# Patient Record
Sex: MALE | Race: AMERICAN INDIAN OR ALASKA NATIVE
[De-identification: names, ages, dates, MRNs, and addresses within clinical notes are randomized per-mention and may not be internally consistent; named-entity substitution may affect disease eponyms.]

---

## 2020-05-31 ENCOUNTER — HOSPITAL ENCOUNTER (EMERGENCY)
Dept: HOSPITAL 5 - ED | Age: 85
Discharge: HOME | End: 2020-05-31
Payer: MEDICARE

## 2020-05-31 VITALS — DIASTOLIC BLOOD PRESSURE: 80 MMHG | SYSTOLIC BLOOD PRESSURE: 163 MMHG

## 2020-05-31 DIAGNOSIS — I10: ICD-10-CM

## 2020-05-31 DIAGNOSIS — E11.9: ICD-10-CM

## 2020-05-31 DIAGNOSIS — Z85.46: ICD-10-CM

## 2020-05-31 DIAGNOSIS — T78.3XXA: Primary | ICD-10-CM

## 2020-05-31 DIAGNOSIS — E78.00: ICD-10-CM

## 2020-05-31 DIAGNOSIS — Z87.891: ICD-10-CM
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
## 2020-05-31 DIAGNOSIS — Z88.0: ICD-10-CM

## 2020-05-31 PROCEDURE — 96375 TX/PRO/DX INJ NEW DRUG ADDON: CPT

## 2020-05-31 PROCEDURE — 99284 EMERGENCY DEPT VISIT MOD MDM: CPT

## 2020-05-31 PROCEDURE — 96365 THER/PROPH/DIAG IV INF INIT: CPT

## 2020-05-31 NOTE — EMERGENCY DEPARTMENT REPORT
ED General Adult HPI





- General


Chief complaint: Allergic Reaction


Stated complaint: SWELLING TO TONGUE


Source: patient, EMS


Mode of arrival: Stretcher


Limitations: No Limitations





- History of Present Illness


Initial comments: 





Patient presents to the emergency department with a chief complaint of swelling 

of his tongue that started last night.  Patient states this morning he began to 

have some swelling of his throat thus the reason for him coming to the emergency

department.  Patient is able to swallow without difficulty.  Patient states this

happened to him 10 years ago after taking penicillin mixed with something else 

that he cannot remember.  Patient is not on arm or an ACE inhibitor.  But he 

does take atenolol, clonidine, hydralazine and another medication for hypert

ension.


-: Sudden


Location: mouth


Severity scale (0 -10): 0


Consistency: constant


Improves with: none


Worsens with: none


Associated Symptoms: denies other symptoms


Treatments Prior to Arrival: none





- Related Data


                                    Allergies











Allergy/AdvReac Type Severity Reaction Status Date / Time


 


Penicillins Allergy  Angioedema Verified 05/31/20 08:09














ED Review of Systems


ROS: 


Stated complaint: SWELLING TO TONGUE


Other details as noted in HPI





Constitutional: denies: chills, fever


Eyes: denies: eye pain, eye discharge, vision change


ENT: denies: ear pain, throat pain


Respiratory: denies: cough, shortness of breath, wheezing


Cardiovascular: denies: chest pain, palpitations


Endocrine: no symptoms reported


Gastrointestinal: denies: abdominal pain, nausea, diarrhea


Genitourinary: denies: urgency, dysuria


Musculoskeletal: denies: back pain, joint swelling, arthralgia


Skin: denies: rash, lesions


Neurological: denies: headache, weakness, paresthesias


Psychiatric: denies: anxiety, depression


Hematological/Lymphatic: denies: easy bleeding, easy bruising





ED Past Medical Hx





- Past Medical History


Previous Medical History?: Yes


Hx Hypertension: Yes


Hx Diabetes: Yes


Hx of Cancer: Yes (Prostate CA)


Additional medical history: high cholesterol





- Surgical History


Past Surgical History?: Yes


Additional Surgical History: prostate surgery 2010





- Social History


Smoking Status: Former Smoker


Substance Use Type: None





ED Physical Exam





- General


Limitations: No Limitations


General appearance: alert, in no apparent distress





- Head


Head exam: Present: atraumatic, normocephalic





- Eye


Eye exam: Present: normal appearance, PERRL, EOMI





- ENT


ENT exam: Present: mucous membranes moist, other (Patient has swelling of the 

tongue there is no lesions to the intraoral mucosa.  There is no pooling of 

saliva.  Patient is able to swallow without difficulty.  There is no swelling to

the soft tissues underneath the tongue at the floor of the mouth)





- Neck


Neck exam: Present: normal inspection





- Respiratory


Respiratory exam: Present: normal lung sounds bilaterally.  Absent: respiratory 

distress





- Cardiovascular


Cardiovascular Exam: Present: regular rate, normal rhythm.  Absent: systolic mur

mur, diastolic murmur, rubs, gallop





- GI/Abdominal


GI/Abdominal exam: Present: soft, normal bowel sounds





- Rectal


Rectal exam: Present: deferred





- Extremities Exam


Extremities exam: Present: normal inspection





- Back Exam


Back exam: Present: normal inspection





- Neurological Exam


Neurological exam: Present: alert, oriented X3





- Psychiatric


Psychiatric exam: Present: normal affect, normal mood





- Skin


Skin exam: Present: warm, dry, intact, normal color.  Absent: rash





ED Course


                                   Vital Signs











  05/31/20 05/31/20 05/31/20





  08:02 08:09 08:16


 


Temperature  98.4 F 


 


Pulse Rate  81 82


 


Respiratory  17 15





Rate   


 


Blood Pressure  183/91 183/91


 


O2 Sat by Pulse 94 97 97





Oximetry   














  05/31/20 05/31/20 05/31/20





  08:27 08:30 09:00


 


Temperature   


 


Pulse Rate  88 83


 


Respiratory 15 18 16





Rate   


 


Blood Pressure  169/87 169/87


 


O2 Sat by Pulse 97 93 93





Oximetry   














  05/31/20 05/31/20 05/31/20





  09:30 10:00 10:30


 


Temperature   


 


Pulse Rate 82 81 78


 


Respiratory 25 H 14 16





Rate   


 


Blood Pressure 150/83 132/78 132/78


 


O2 Sat by Pulse 92 91 94





Oximetry   














  05/31/20 05/31/20





  11:00 11:30


 


Temperature  


 


Pulse Rate 77 77


 


Respiratory 12 14





Rate  


 


Blood Pressure 159/82 145/80


 


O2 Sat by Pulse 92 91





Oximetry  














ED Medical Decision Making





- Medical Decision Making


Patient received IV Solu-Medrol, Pepcid, Benadryl


On reevaluation of the patient at 9:30 AM he complained of having some 

difficulty with swallowing and his tongue appeared to be larger


TXA was ordered


Patient was reevaluated at 11 AM and shows significant improvement


Repeat examination at 11:50 AM showed that the patient's angioedema has 

completely resolved


It was discussed with the patient that he should speak to his primary care 

physician about switching hypertension meds specifically clonidine which can 

cause angioedema


Critical care attestation.: 


If time is entered above; I have spent that time in minutes in the direct care 

of this critically ill patient, excluding procedure time.








ED Disposition


Clinical Impression: 


 Angioedema





Disposition: DC-01 TO HOME OR SELFCARE


Is pt being admited?: No


Does the pt Need Aspirin: No


Condition: Stable


Instructions:  Angioedema (ED)


Additional Instructions: 


return if worse


Please follow-up with your primary care physician and have further evaluation 

for substitute blood pressure medication


Please discontinue clonidine which can cause angioedema


Time of Disposition: 11:57

## 2020-09-11 ENCOUNTER — HOSPITAL ENCOUNTER (EMERGENCY)
Dept: HOSPITAL 5 - ED | Age: 85
LOS: 1 days | Discharge: HOME | End: 2020-09-12
Payer: MEDICARE

## 2020-09-11 DIAGNOSIS — E11.9: ICD-10-CM

## 2020-09-11 DIAGNOSIS — Z88.0: ICD-10-CM

## 2020-09-11 DIAGNOSIS — N28.89: Primary | ICD-10-CM

## 2020-09-11 DIAGNOSIS — Z98.890: ICD-10-CM

## 2020-09-11 DIAGNOSIS — E78.00: ICD-10-CM

## 2020-09-11 DIAGNOSIS — I10: ICD-10-CM

## 2020-09-11 LAB
ALBUMIN SERPL-MCNC: 4.3 G/DL (ref 3.9–5)
ALT SERPL-CCNC: 12 UNITS/L (ref 7–56)
APTT BLD: 31.9 SEC. (ref 24.2–36.6)
BASOPHILS # (AUTO): 0 K/MM3 (ref 0–0.1)
BASOPHILS NFR BLD AUTO: 0.7 % (ref 0–1.8)
BUN SERPL-MCNC: 37 MG/DL (ref 9–20)
BUN/CREAT SERPL: 21 %
CALCIUM SERPL-MCNC: 9.7 MG/DL (ref 8.4–10.2)
EOSINOPHIL # BLD AUTO: 0.1 K/MM3 (ref 0–0.4)
EOSINOPHIL NFR BLD AUTO: 1.4 % (ref 0–4.3)
HCT VFR BLD CALC: 42 % (ref 35.5–45.6)
HEMOLYSIS INDEX: 10
HGB BLD-MCNC: 14 GM/DL (ref 11.8–15.2)
INR PPP: 1.02 (ref 0.87–1.13)
LYMPHOCYTES # BLD AUTO: 0.3 K/MM3 (ref 1.2–5.4)
LYMPHOCYTES NFR BLD AUTO: 6.6 % (ref 13.4–35)
MCHC RBC AUTO-ENTMCNC: 33 % (ref 32–34)
MCV RBC AUTO: 97 FL (ref 84–94)
MONOCYTES # (AUTO): 0.7 K/MM3 (ref 0–0.8)
MONOCYTES % (AUTO): 13.7 % (ref 0–7.3)
PLATELET # BLD: 106 K/MM3 (ref 140–440)
RBC # BLD AUTO: 4.32 M/MM3 (ref 3.65–5.03)

## 2020-09-11 PROCEDURE — 85730 THROMBOPLASTIN TIME PARTIAL: CPT

## 2020-09-11 PROCEDURE — 85025 COMPLETE CBC W/AUTO DIFF WBC: CPT

## 2020-09-11 PROCEDURE — 99284 EMERGENCY DEPT VISIT MOD MDM: CPT

## 2020-09-11 PROCEDURE — 74176 CT ABD & PELVIS W/O CONTRAST: CPT

## 2020-09-11 PROCEDURE — 74177 CT ABD & PELVIS W/CONTRAST: CPT

## 2020-09-11 PROCEDURE — 83690 ASSAY OF LIPASE: CPT

## 2020-09-11 PROCEDURE — 96375 TX/PRO/DX INJ NEW DRUG ADDON: CPT

## 2020-09-11 PROCEDURE — 96372 THER/PROPH/DIAG INJ SC/IM: CPT

## 2020-09-11 PROCEDURE — 81001 URINALYSIS AUTO W/SCOPE: CPT

## 2020-09-11 PROCEDURE — 85610 PROTHROMBIN TIME: CPT

## 2020-09-11 PROCEDURE — 36415 COLL VENOUS BLD VENIPUNCTURE: CPT

## 2020-09-11 PROCEDURE — 96374 THER/PROPH/DIAG INJ IV PUSH: CPT

## 2020-09-11 PROCEDURE — 80053 COMPREHEN METABOLIC PANEL: CPT

## 2020-09-11 NOTE — CAT SCAN REPORT
CT abdomen pelvis wo con



INDICATION:  RLQ ab pain.



TECHNIQUE:

All CT scans at this location are performed using the following dose modulation technique: Automated 
exposure control.



CONTRAST:  None.



COMPARISON: None available.



CT ABDOMEN: Evaluation of the parenchymal organs demonstrates a 2.8 cm right renal cyst. A 7.7 cm les
ion arising from the posterior aspect of the right kidney is not a simple cyst. There is a small amou
nt of calcification seen along the lateral aspect..



Negative for abdominal mass, fluid or inflammation. The bowel is not dilated or thickened. Evaluation
 is limited by motion artifact.



CT PELVIS: The appendix is normal. Negative for pelvic mass or fluid collection. Colonic stool is mod
erate.



Radioactive prostate seeds are present.



IMPRESSION:

1. Indeterminate right renal mass.

2. Moderate colonic stool.

3. Evaluation limited by significant motion artifact. 



Signer Name: Sedrick Hawthorne MD 

Signed: 9/11/2020 10:58 PM

Workstation Name: [id number]

## 2020-09-11 NOTE — EMERGENCY DEPARTMENT REPORT
ED General Adult HPI





- General


Chief complaint: Abdominal Pain


Stated complaint: ABDOMINAL AND BACK PAIN


Time Seen by Provider: 20 21:41


Source: patient, EMS


Mode of arrival: Stretcher


Limitations: No Limitations





- History of Present Illness


Initial comments: 





The patient presents to the emergency department the chief complaint of 

abdominal pain located on the lower right aspect of his abdomen that started 

yesterday.  Patient describes the pain is sharp in nature and denies any nausea 

vomiting.  Patient also denies any radiation of the abdominal pain.  Patient 

denies chest pain, shortness breath, or headache.  The patient also complains of

right  flank pain.


-: days(s) (1)


Location: abdomen


Radiation: non-radiation


Severity scale (0 -10): 6


Quality: sharp


Consistency: constant


Improves with: none


Worsens with: none


Associated Symptoms: denies other symptoms


Treatments Prior to Arrival: none





- Related Data


                                  Previous Rx's











 Medication  Instructions  Recorded  Last Taken  Type


 


HYDROcodone/APAP 5-325 [[place] 1 each PO Q6HR PRN #12 tablet 20 Unknown Rx





5/325]    


 


Ondansetron [Zofran Odt] 4 mg PO Q4HR PRN #20 tab.rapdis 20 Unknown Rx











                                    Allergies











Allergy/AdvReac Type Severity Reaction Status Date / Time


 


Penicillins Allergy  Angioedema Verified 20 08:09














ED Review of Systems


ROS: 


Stated complaint: ABDOMINAL AND BACK PAIN


Other details as noted in HPI





Constitutional: denies: chills, fever


Eyes: denies: eye pain, eye discharge, vision change


ENT: denies: ear pain, throat pain


Respiratory: denies: cough, shortness of breath, wheezing


Cardiovascular: denies: chest pain, palpitations


Endocrine: no symptoms reported


Gastrointestinal: abdominal pain.  denies: nausea, diarrhea


Genitourinary: denies: urgency, dysuria


Musculoskeletal: denies: back pain, joint swelling, arthralgia


Skin: denies: rash, lesions


Neurological: denies: headache, weakness, paresthesias


Psychiatric: denies: anxiety, depression


Hematological/Lymphatic: denies: easy bleeding, easy bruising





ED Past Medical Hx





- Past Medical History


Previous Medical History?: Yes


Hx Hypertension: Yes


Hx Diabetes: Yes


Hx Renal Disease: Yes


Additional medical history: high cholesterol





- Surgical History


Past Surgical History?: Yes


Additional Surgical History: prostate surgery 





- Social History


Smoking Status: Never Smoker


Substance Use Type: None





- Medications


Home Medications: 


                                Home Medications











 Medication  Instructions  Recorded  Confirmed  Last Taken  Type


 


HYDROcodone/APAP 5-325 [[place] 1 each PO Q6HR PRN #12 tablet 20  Unknown Rx





5/325]     


 


Ondansetron [Zofran Odt] 4 mg PO Q4HR PRN #20 tab.rapdis 20  Unknown Rx














ED Physical Exam





- General


Limitations: No Limitations


General appearance: alert, in no apparent distress





- Head


Head exam: Present: atraumatic, normocephalic





- Eye


Eye exam: Present: normal appearance, PERRL, EOMI





- ENT


ENT exam: Present: mucous membranes moist





- Neck


Neck exam: Present: normal inspection





- Respiratory


Respiratory exam: Present: normal lung sounds bilaterally.  Absent: respiratory 

distress





- Cardiovascular


Cardiovascular Exam: Present: regular rate, normal rhythm.  Absent: systolic 

murmur, diastolic murmur, rubs, gallop





- GI/Abdominal


GI/Abdominal exam: Present: soft, tenderness (ttp rlq), normal bowel sounds.  

Absent: distended





- Rectal


Rectal exam: Present: deferred





- Extremities Exam


Extremities exam: Present: normal inspection





- Back Exam


Back exam: Present: normal inspection





- Neurological Exam


Neurological exam: Present: alert, oriented X3, CN II-XII intact.  Absent: motor

sensory deficit





- Psychiatric


Psychiatric exam: Present: normal affect, normal mood





- Skin


Skin exam: Present: warm, dry, intact, normal color.  Absent: rash





ED Course


                                   Vital Signs











  20





  21:12 21:45


 


Temperature 97.5 F L 


 


Pulse Rate 72 


 


Respiratory 18 19





Rate  


 


Blood Pressure 208/88 


 


O2 Sat by Pulse 96 97





Oximetry  














ED Medical Decision Making





- Lab Data


Result diagrams: 


                                 20 21:20





                                 20 21:20








                                   Lab Results











  20 Range/Units





  21:20 21:20 22:34 


 


WBC  5.0    (4.5-11.0)  K/mm3


 


RBC  4.32    (3.65-5.03)  M/mm3


 


Hgb  14.0    (11.8-15.2)  gm/dl


 


Hct  42.0    (35.5-45.6)  %


 


MCV  97 H    (84-94)  fl


 


MCH  32    (28-32)  pg


 


MCHC  33    (32-34)  %


 


RDW  13.2    (13.2-15.2)  %


 


Plt Count  106 L    (140-440)  K/mm3


 


Lymph % (Auto)  6.6 L    (13.4-35.0)  %


 


Mono % (Auto)  13.7 H    (0.0-7.3)  %


 


Eos % (Auto)  1.4    (0.0-4.3)  %


 


Baso % (Auto)  0.7    (0.0-1.8)  %


 


Lymph #  0.3 L    (1.2-5.4)  K/mm3


 


Mono #  0.7    (0.0-0.8)  K/mm3


 


Eos #  0.1    (0.0-0.4)  K/mm3


 


Baso #  0.0    (0.0-0.1)  K/mm3


 


Seg Neutrophils %  77.6 H    (40.0-70.0)  %


 


Seg Neutrophils #  3.9    (1.8-7.7)  K/mm3


 


PT    13.5  (12.2-14.9)  Sec.


 


INR    1.02  (0.87-1.13)  


 


APTT    31.9  (24.2-36.6)  Sec.


 


Sodium   139   (137-145)  mmol/L


 


Potassium   3.7   (3.6-5.0)  mmol/L


 


Chloride   98.2   ()  mmol/L


 


Carbon Dioxide   27   (22-30)  mmol/L


 


Anion Gap   18   mmol/L


 


BUN   37 H   (9-20)  mg/dL


 


Creatinine   1.8 H   (0.8-1.3)  mg/dL


 


Estimated GFR   44   ml/min


 


BUN/Creatinine Ratio   21   %


 


Glucose   159 H   ()  mg/dL


 


Calcium   9.7   (8.4-10.2)  mg/dL


 


Total Bilirubin   1.40 H   (0.1-1.2)  mg/dL


 


AST   15   (5-40)  units/L


 


ALT   12   (7-56)  units/L


 


Alkaline Phosphatase   57   ()  units/L


 


Total Protein   7.4   (6.3-8.2)  g/dL


 


Albumin   4.3   (3.9-5)  g/dL


 


Albumin/Globulin Ratio   1.4   %


 


Lipase     (13-60)  units/L


 


Urine Color     (Yellow)  


 


Urine Turbidity     (Clear)  


 


Urine pH     (5.0-7.0)  


 


Ur Specific Gravity     (1.003-1.030)  


 


Urine Protein     (Negative)  mg/dL


 


Urine Glucose (UA)     (Negative)  mg/dL


 


Urine Ketones     (Negative)  mg/dL


 


Urine Blood     (Negative)  


 


Urine Nitrite     (Negative)  


 


Urine Bilirubin     (Negative)  


 


Urine Urobilinogen     (<2.0)  mg/dL


 


Ur Leukocyte Esterase     (Negative)  


 


Urine WBC (Auto)     (0.0-6.0)  /HPF


 


Urine RBC (Auto)     (0.0-6.0)  /HPF


 


Urine Mucus     /HPF














  20 Range/Units





  22:34 00:42 


 


WBC    (4.5-11.0)  K/mm3


 


RBC    (3.65-5.03)  M/mm3


 


Hgb    (11.8-15.2)  gm/dl


 


Hct    (35.5-45.6)  %


 


MCV    (84-94)  fl


 


MCH    (28-32)  pg


 


MCHC    (32-34)  %


 


RDW    (13.2-15.2)  %


 


Plt Count    (140-440)  K/mm3


 


Lymph % (Auto)    (13.4-35.0)  %


 


Mono % (Auto)    (0.0-7.3)  %


 


Eos % (Auto)    (0.0-4.3)  %


 


Baso % (Auto)    (0.0-1.8)  %


 


Lymph #    (1.2-5.4)  K/mm3


 


Mono #    (0.0-0.8)  K/mm3


 


Eos #    (0.0-0.4)  K/mm3


 


Baso #    (0.0-0.1)  K/mm3


 


Seg Neutrophils %    (40.0-70.0)  %


 


Seg Neutrophils #    (1.8-7.7)  K/mm3


 


PT    (12.2-14.9)  Sec.


 


INR    (0.87-1.13)  


 


APTT    (24.2-36.6)  Sec.


 


Sodium    (137-145)  mmol/L


 


Potassium    (3.6-5.0)  mmol/L


 


Chloride    ()  mmol/L


 


Carbon Dioxide    (22-30)  mmol/L


 


Anion Gap    mmol/L


 


BUN    (9-20)  mg/dL


 


Creatinine    (0.8-1.3)  mg/dL


 


Estimated GFR    ml/min


 


BUN/Creatinine Ratio    %


 


Glucose    ()  mg/dL


 


Calcium    (8.4-10.2)  mg/dL


 


Total Bilirubin    (0.1-1.2)  mg/dL


 


AST    (5-40)  units/L


 


ALT    (7-56)  units/L


 


Alkaline Phosphatase    ()  units/L


 


Total Protein    (6.3-8.2)  g/dL


 


Albumin    (3.9-5)  g/dL


 


Albumin/Globulin Ratio    %


 


Lipase  36   (13-60)  units/L


 


Urine Color   Yellow  (Yellow)  


 


Urine Turbidity   Clear  (Clear)  


 


Urine pH   5.0  (5.0-7.0)  


 


Ur Specific Gravity   1.015  (1.003-1.030)  


 


Urine Protein   <15 mg/dl  (Negative)  mg/dL


 


Urine Glucose (UA)   Neg  (Negative)  mg/dL


 


Urine Ketones   Neg  (Negative)  mg/dL


 


Urine Blood   Neg  (Negative)  


 


Urine Nitrite   Neg  (Negative)  


 


Urine Bilirubin   Neg  (Negative)  


 


Urine Urobilinogen   < 2.0  (<2.0)  mg/dL


 


Ur Leukocyte Esterase   Neg  (Negative)  


 


Urine WBC (Auto)   < 1.0  (0.0-6.0)  /HPF


 


Urine RBC (Auto)   2.0  (0.0-6.0)  /HPF


 


Urine Mucus   Few  /HPF














- Radiology Data


Radiology results: report reviewed











Referring Physician: ANNA HOLT


 


Patient Name: VINICIUS GUERRERO


 


Patient ID: [id number]


 


YOB: 1935


 


Sex: Male


 


Accession: [id number]


 


Report Date: 2020


 


Report Status: Final[place]








[place] [address] Cat

Scan Report Signed Patient: VINICIUS GUERRERO MR#: [id number] : 1935 

Acct:[id number] Age/Sex: 85 / M ADM Date: 20 Loc: ED Attending Dr: 

Ordering Physician: ANNA HOLT MD Date of Service: 20 Procedure(s): CT

abdomen pelvis w con Accession Number(s): [id number] cc: ANNA HOLT MD CT 

abdomen pelvis w con INDICATION: renal mass. TECHNIQUE: All CT scans at this 

location are performed using the following dose modulation technique: Automated 

exposure control. CONTRAST: Omnipaque 300, 75 cc IV injection. COMPARISON: 

Noncontrast study previous day. CT ABDOMEN: Evaluation of the parenchymal organs

demonstrates a lesion arising from the posterior aspect of the right kidney 

measuring 7.7 cm. This is mildly complex, contains calcification and greater in 

density than simple fluid but does not demonstrate significant enhancement. A 

benign-appearing cyst is again seen at the left kidney. The remaining 

parenchymal organs are unremarkable. Negative for abdominal mass, fluid or 

inflammation. A small fat-containing umbilical hernia is present. Calcified 

gallstones are noted. CT PELVIS: Negative for pelvic mass, fluid or 

inflammation. Radioactive prostate seeds are present. IMPRESSION: 1. Complex 

lesion right kidney does not demonstrate significant enhancement. 2. Calcified 

gallstones. Signer Name: Sedrick Hawthorne MD Signed: 2020 1:06 AM 

Workstation Name: [id number] Transcribed By: ES Dictated By: Sedrick Hawthorne MD Electronically Authenticated By: Sedrick Hawthorne MD Signed 

Date/Time: 20 DD/DT: 20 TD/TT: 





- Medical Decision Making





Discussed results with patient and need to follow-up with urology for evaluation

of his renal mass which the patient states he understands


Critical care attestation.: 


If time is entered above; I have spent that time in minutes in the direct care 

of this critically ill patient, excluding procedure time.








ED Disposition


Clinical Impression: 


 Renal mass, right





Disposition: DC-01 TO HOME OR SELFCARE


Is pt being admited?: No


Does the pt Need Aspirin: No


Condition: Stable


Instructions:  Flank Pain (ED)


Additional Instructions: 


return if worse


Please follow up with the provided physicians for further evaluation of your 

renal mass


Referrals: 


ELIAS LEWIS MD [Primary Care Provider] - 3-5 Days


RJ DAVEY MD [Staff Physician] - 3-5 Days


Time of Disposition: 01:37

## 2020-09-12 VITALS — SYSTOLIC BLOOD PRESSURE: 158 MMHG | DIASTOLIC BLOOD PRESSURE: 76 MMHG

## 2020-09-12 LAB
BILIRUB UR QL STRIP: (no result)
BLOOD UR QL VISUAL: (no result)
MUCOUS THREADS #/AREA URNS HPF: (no result) /HPF
PH UR STRIP: 5 [PH] (ref 5–7)
PROT UR STRIP-MCNC: (no result) MG/DL
RBC #/AREA URNS HPF: 2 /HPF (ref 0–6)
UROBILINOGEN UR-MCNC: < 2 MG/DL (ref ?–2)
WBC #/AREA URNS HPF: < 1 /HPF (ref 0–6)

## 2020-09-12 NOTE — CAT SCAN REPORT
CT abdomen pelvis w con



INDICATION:  renal mass.



TECHNIQUE:

All CT scans at this location are performed using the following dose modulation technique: Automated 
exposure control.



CONTRAST:  Omnipaque 300, 75 cc IV injection.



COMPARISON: Noncontrast study previous day.



CT ABDOMEN: Evaluation of the parenchymal organs demonstrates a lesion arising from the posterior asp
ect of the right kidney measuring 7.7 cm. This is mildly complex, contains calcification and greater 
in density than simple fluid but does not demonstrate significant enhancement. A benign-appearing cys
t is again seen at the left kidney. The remaining parenchymal organs are unremarkable.



Negative for abdominal mass, fluid or inflammation. A small fat-containing umbilical hernia is presen
t. Calcified gallstones are noted.



CT PELVIS: Negative for pelvic mass, fluid or inflammation. Radioactive prostate seeds are present.



IMPRESSION:

1. Complex lesion right kidney does not demonstrate significant enhancement.

2. Calcified gallstones.



Signer Name: Sedrick Hawthorne MD 

Signed: 9/12/2020 1:06 AM

Workstation Name: [id number]

## 2022-05-10 ENCOUNTER — HOSPITAL ENCOUNTER (EMERGENCY)
Dept: HOSPITAL 5 - ED | Age: 87
Discharge: HOME | End: 2022-05-10
Payer: MEDICARE

## 2022-05-10 VITALS — SYSTOLIC BLOOD PRESSURE: 140 MMHG | DIASTOLIC BLOOD PRESSURE: 80 MMHG

## 2022-05-10 DIAGNOSIS — M10.9: Primary | ICD-10-CM

## 2022-05-10 DIAGNOSIS — Z88.0: ICD-10-CM

## 2022-05-10 DIAGNOSIS — E11.9: ICD-10-CM

## 2022-05-10 DIAGNOSIS — I10: ICD-10-CM

## 2022-05-10 PROCEDURE — 99283 EMERGENCY DEPT VISIT LOW MDM: CPT

## 2022-05-10 NOTE — EMERGENCY DEPARTMENT REPORT
ED Lower Extremity HPI





- General


Chief Complaint: Extremity Problem,Nontraumatic


Stated Complaint: FOOT PAIN/GOUT FLARE UP


Time Seen by Provider: 05/10/22 10:38


Source: patient, EMS


Mode of arrival: Stretcher


Limitations: No Limitations





- History of Present Illness


MD Complaint: foot injury


-: Gradual


Injury: Foot: Left (Left hallux at the first metatarsophalangeal joint)


Place: home


Severity: mild, moderate


Improves With: nothing


Worsens With: weight bearing, movement, palpation


Associated Symptoms: swelling, able to partially bear weight





- Related Data


                                  Previous Rx's











 Medication  Instructions  Recorded  Last Taken  Type


 


Ondansetron [Zofran ODT TAB] 4 mg PO Q4HR PRN #20 tab.rapdis 09/12/20 Unknown Rx


 


HYDROcodone/APAP 5-325 [[place] 1 each PO Q6HR PRN #12 tablet 10/03/20 Unknown Rx





5-325 mg TAB]    


 


Losartan [Cozaar] 50 mg PO QDAY #30 tablet 10/03/20 Unknown Rx


 


Ascorbic Acid [Vitamin C] 500 mg PO BID #10 tablet 11/19/20 Unknown Rx


 


Dexamethasone 6 mg PO DAILY #4 tablet 11/19/20 Unknown Rx


 


carvediloL [Coreg] 6.25 mg PO BID #60 tablet 11/19/20 Unknown Rx


 


levoFLOXacin [Levaquin TAB] 500 mg PO Q48H #4 tablet 11/19/20 Unknown Rx


 


Colchicine 0.6 mg PO Q2HR #20 05/10/22 Unknown Rx


 


Indomethacin [Indocin] 25 mg PO Q8H #14 cap 05/10/22 Unknown Rx











                                    Allergies











Allergy/AdvReac Type Severity Reaction Status Date / Time


 


Penicillins Allergy  Angioedema Verified 05/31/20 08:09














ED Review of Systems


ROS: 


Stated complaint: FOOT PAIN/GOUT FLARE UP


Other details as noted in HPI





Comment: All other systems reviewed and negative





ED Past Medical Hx





- Past Medical History


Hx Hypertension: Yes


Hx Diabetes: Yes


Hx Deep Vein Thrombosis:  (unknown)


Hx Pulmonary Embolism: No


Hx Liver Disease: No


Hx Renal Disease: No


Hx Sickle Cell Disease: No


Hx Arthritis: No


Hx Seizures: No


Hx Kidney Stones: No


Hx Asthma: No


Hx COPD: No


Hx Tuberculosis: No


Hx Dementia: No


Hx HIV: No


Additional medical history: high cholesterol





- Surgical History


Hx Pacemaker: No


Hx Internal Defibrillator: No


Additional Surgical History: prostate surgery 2010





- Social History


Smoking Status: Never Smoker





- Medications


Home Medications: 


                                Home Medications











 Medication  Instructions  Recorded  Confirmed  Last Taken  Type


 


Ondansetron [Zofran ODT TAB] 4 mg PO Q4HR PRN #20 tab.rapdis 09/12/20 11/14/20 

Unknown Rx


 


HYDROcodone/APAP 5-325 [[place] 1 each PO Q6HR PRN #12 tablet 10/03/20 11/14/20 

Unknown Rx





5-325 mg TAB]     


 


Losartan [Cozaar] 50 mg PO QDAY #30 tablet 10/03/20 11/14/20 Unknown Rx


 


Ascorbic Acid [Vitamin C] 500 mg PO BID #10 tablet 11/19/20  Unknown Rx


 


Dexamethasone 6 mg PO DAILY #4 tablet 11/19/20  Unknown Rx


 


carvediloL [Coreg] 6.25 mg PO BID #60 tablet 11/19/20  Unknown Rx


 


levoFLOXacin [Levaquin TAB] 500 mg PO Q48H #4 tablet 11/19/20  Unknown Rx


 


Colchicine 0.6 mg PO Q2HR #20 05/10/22  Unknown Rx


 


Indomethacin [Indocin] 25 mg PO Q8H #14 cap 05/10/22  Unknown Rx














ED Physical Exam





- General


Limitations: No Limitations


General appearance: alert, in no apparent distress





- Head


Head exam: Present: atraumatic, normocephalic





- Eye


Eye exam: Present: normal appearance





- ENT


ENT exam: Present: mucous membranes moist





- Neck


Neck exam: Present: normal inspection





- Respiratory


Respiratory exam: Present: normal lung sounds bilaterally.  Absent: respiratory 

distress





- Cardiovascular


Cardiovascular Exam: Present: regular rate, normal rhythm.  Absent: systolic 

murmur, diastolic murmur, rubs, gallop





- GI/Abdominal


GI/Abdominal exam: Present: soft, normal bowel sounds





- Rectal


Rectal exam: Present: deferred





- Extremities Exam


Extremities exam: Present: normal inspection, tenderness (Left 

metatarsophalangeal joint #1.  Symptom is warmth mild redness is present.  Pain 

with with manipulation of the first phalanges.  No broken skin no evidence of 

any cellulitis.  No lymphangitis present.  Pulses 2+ significant onychomycosis 

of all present to all toenails), normal capillary refill





- Back Exam


Back exam: Present: normal inspection





- Neurological Exam


Neurological exam: Present: alert, oriented X3





- Psychiatric


Psychiatric exam: Present: normal affect, normal mood





- Skin


Skin exam: Present: warm, dry, intact, normal color.  Absent: rash





ED Course





                                   Vital Signs











  05/10/22





  08:24


 


Pulse Rate 84


 


Blood Pressure 148/82





[Left] 


 


O2 Sat by Pulse 98





Oximetry 











Critical care attestation.: 


If time is entered above; I have spent that time in minutes in the direct care 

of this critically ill patient, excluding procedure time.








ED Disposition


Clinical Impression: 


 [facility]





Disposition: 01 HOME / SELF CARE / HOMELESS


Is pt being admited?: No


Does the pt Need Aspirin: No


Condition: Stable


Instructions:  Low-Purine Eating Plan


Prescriptions: 


Colchicine 0.6 mg PO Q2HR #20


Indomethacin [Indocin] 25 mg PO Q8H #14 cap


Referrals: 


[facility] [Provider Group] - 3-5 Days